# Patient Record
Sex: MALE | Race: WHITE
[De-identification: names, ages, dates, MRNs, and addresses within clinical notes are randomized per-mention and may not be internally consistent; named-entity substitution may affect disease eponyms.]

---

## 2018-08-23 ENCOUNTER — HOSPITAL ENCOUNTER (EMERGENCY)
Dept: HOSPITAL 25 - UCCORT | Age: 23
Discharge: HOME | End: 2018-08-23
Payer: COMMERCIAL

## 2018-08-23 VITALS — SYSTOLIC BLOOD PRESSURE: 149 MMHG | DIASTOLIC BLOOD PRESSURE: 70 MMHG

## 2018-08-23 DIAGNOSIS — B35.9: Primary | ICD-10-CM

## 2018-08-23 DIAGNOSIS — B34.9: ICD-10-CM

## 2018-08-23 PROCEDURE — 86618 LYME DISEASE ANTIBODY: CPT

## 2018-08-23 PROCEDURE — 99202 OFFICE O/P NEW SF 15 MIN: CPT

## 2018-08-23 PROCEDURE — G0463 HOSPITAL OUTPT CLINIC VISIT: HCPCS

## 2018-08-23 NOTE — UC
Skin Complaint HPI





- HPI Summary


HPI Summary: 





Pt c/o circular mildly itchy erythematous rash on posterior distal thigh. Pt 

also c/o generalized malaise, fever, chills X 3 days. 





- History of Current Complaint


Chief Complaint: UCGeneralIllness


Time Seen by Provider: 08/23/18 20:01


Stated Complaint: SKIN COMPLAINT


Hx Obtained From: Patient


Onset/Duration: Sudden Onset, Lasting Days, Still Present, Resolved


Timing: Constant


Onset Severity: Mild


Current Severity: Mild


Pain Intensity: 6


Location: Discrete


Character: Pruritus, Redness


Aggravating Factor(s): Nothing


Alleviating Factor(s): Unknown


Associated Signs & Symptoms: Positive: Fever, Chills, Rash





- Allergy/Home Medications


Allergies/Adverse Reactions: 


 Allergies











Allergy/AdvReac Type Severity Reaction Status Date / Time


 


No Known Allergies Allergy   Verified 08/23/18 19:50











Home Medications: 


 Home Medications





D-Methorphan/PE/Acetaminophen [Day Time Cold-Flu Relief Liq] 1 liq PO PRN 08/23/ 18 [History]


Naproxen Sodium [Aleve] 220 mg PO PRN 08/23/18 [History]











Review of Systems


Constitutional: Fever, Chills, Fatigue


Skin: Rash


Eyes: Negative


ENT: Negative


Respiratory: Negative


Cardiovascular: Negative


Gastrointestinal: Negative


Genitourinary: Negative


Motor: Negative


Neurovascular: Negative


Musculoskeletal: Negative


Neurological: Negative


Psychological: Negative


Is Patient Immunocompromised?: No


All Other Systems Reviewed And Are Negative: Yes





PMH/Surg Hx/FS Hx/Imm Hx


Previously Healthy: Yes





- Surgical History


Surgical History: None





- Family History


Known Family History: Positive: Cardiac Disease





- Social History


Occupation: Unemployed


Lives: With Family


Alcohol Use: Daily


Substance Use Type: None


Smoking Status (MU): Never Smoked Tobacco


Have You Smoked in the Last Year: No





Physical Exam


Triage Information Reviewed: Yes


Appearance: Well-Appearing


Vital Signs: 


 Initial Vital Signs











Temp  98.1 F   08/23/18 19:52


 


Pulse  78   08/23/18 19:52


 


Resp  15   08/23/18 19:52


 


BP  149/70   08/23/18 19:52


 


Pulse Ox  98   08/23/18 19:52











Vital Signs Reviewed: Yes


Eye Exam: Normal


ENT Exam: Normal


Dental Exam: Normal


Neck exam: Normal


Respiratory Exam: Normal


Cardiovascular Exam: Normal


Musculoskeletal Exam: Normal


Neurological Exam: Normal


Psychological Exam: Normal


Skin: Positive: rashes - 3cm cicular erythematous, with flaky dry skin, with 

erythematous center and raised tiny pin prick areas





Diagnostics





- Laboratory


Diagnostic Studies Completed/Ordered: rapid flu: negative





Course/Dx





- Course


Course Of Treatment: Pt denies ever being bit by a tick.





- Differential Diagnoses - Skin Complaint


Differential Diagnoses: Tick Born Illness, Tinea





- Diagnoses


Provider Diagnoses: ringworm.  viral syndrome





Discharge





- Sign-Out/Discharge


Documenting (check all that apply): Patient Departure


All imaging exams completed and their final reports reviewed: No Studies





- Discharge Plan


Condition: Stable


Disposition: HOME


Prescriptions: 


Terbinafine HCl [Antifungal] 15 gm TP Q12H 14 Days #1 tube


Patient Education Materials:  Viral Syndrome (ED), Skin Yeast Infection (ED)


Referrals: 


Carl Domingo MD [Primary Care Provider] - If Needed





- Billing Disposition and Condition


Condition: STABLE


Disposition: Home